# Patient Record
Sex: FEMALE | Race: WHITE | NOT HISPANIC OR LATINO | URBAN - METROPOLITAN AREA
[De-identification: names, ages, dates, MRNs, and addresses within clinical notes are randomized per-mention and may not be internally consistent; named-entity substitution may affect disease eponyms.]

---

## 2024-02-09 ENCOUNTER — OFFICE VISIT (OUTPATIENT)
Dept: WOUND CARE | Facility: HOSPITAL | Age: 84
End: 2024-02-09
Attending: NURSE PRACTITIONER
Payer: MEDICARE

## 2024-02-09 VITALS
DIASTOLIC BLOOD PRESSURE: 63 MMHG | RESPIRATION RATE: 16 BRPM | HEART RATE: 76 BPM | TEMPERATURE: 98 F | SYSTOLIC BLOOD PRESSURE: 115 MMHG

## 2024-02-09 DIAGNOSIS — L89.224 STAGE IV PRESSURE ULCER OF LEFT HIP: Primary | ICD-10-CM

## 2024-02-09 PROCEDURE — 11042 DBRDMT SUBQ TIS 1ST 20SQCM/<: CPT

## 2024-02-09 PROCEDURE — 99203 OFFICE O/P NEW LOW 30 MIN: CPT | Mod: 25

## 2024-02-09 NOTE — PROGRESS NOTES
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   HENT:      Head: Normocephalic.   Pulmonary:      Effort: Pulmonary effort is normal.   Musculoskeletal:      Comments: Bed or wheelchair bound   Skin:     General: Skin is warm and dry.      Comments: Stage 4 pressure ulcer to left hip with muscle exposed.   Neurological:      Mental Status: She is alert. Mental status is at baseline.      Comments: Pt with dementia     Ulcer debrided of slough, will use mesalt qod, calmoseptine to norbert wound. Keep clean, dry and covered, offload pressure and turn q 2 hrs avoiding left hip, culture result noted from nursing home and pt is currently on cipro 500 mg po bid for 10 days. Will recheck in one week, her potential to heal is good.  See wound doc progress notes. Documents will be scanned.        Jeanette Valdez  [facility]

## 2024-02-16 ENCOUNTER — OFFICE VISIT (OUTPATIENT)
Dept: WOUND CARE | Facility: HOSPITAL | Age: 84
End: 2024-02-16
Attending: NURSE PRACTITIONER
Payer: MEDICARE

## 2024-02-16 VITALS
RESPIRATION RATE: 17 BRPM | DIASTOLIC BLOOD PRESSURE: 61 MMHG | SYSTOLIC BLOOD PRESSURE: 113 MMHG | TEMPERATURE: 98 F | HEART RATE: 73 BPM

## 2024-02-16 DIAGNOSIS — L89.224 STAGE IV PRESSURE ULCER OF LEFT HIP: Primary | ICD-10-CM

## 2024-02-16 PROCEDURE — 11042 DBRDMT SUBQ TIS 1ST 20SQCM/<: CPT

## 2024-02-16 NOTE — PROGRESS NOTES
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   HENT:      Head: Normocephalic.   Pulmonary:      Effort: Pulmonary effort is normal.   Musculoskeletal:      Comments: In a wheelchair   Skin:     General: Skin is warm and dry.      Comments: Stage 4 pressure ulcer to left hip with muscle exposed.   Neurological:      Mental Status: She is alert. Mental status is at baseline.     Ulcer debrided, doing better with less slough, still have not received finalized version of culture from nursing home. Pt is on cipro per PCP, will continue to pack with mesalt qod, keep clean and dry and will recheck in 2 weeks. Her potential to heal is fair.  See wound doc progress notes. Documents will be scanned.        Jeanette Valdez  [facility]

## 2024-03-01 ENCOUNTER — OFFICE VISIT (OUTPATIENT)
Dept: WOUND CARE | Facility: HOSPITAL | Age: 84
End: 2024-03-01
Attending: NURSE PRACTITIONER
Payer: MEDICARE

## 2024-03-01 VITALS
RESPIRATION RATE: 17 BRPM | TEMPERATURE: 99 F | DIASTOLIC BLOOD PRESSURE: 65 MMHG | HEART RATE: 75 BPM | SYSTOLIC BLOOD PRESSURE: 110 MMHG

## 2024-03-01 DIAGNOSIS — L89.224 STAGE IV PRESSURE ULCER OF LEFT HIP: Primary | ICD-10-CM

## 2024-03-01 PROCEDURE — 11042 DBRDMT SUBQ TIS 1ST 20SQCM/<: CPT

## 2024-03-01 NOTE — PROGRESS NOTES
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 Documents will be scanned.      Physical Exam  Vitals reviewed.   Pulmonary:      Effort: Pulmonary effort is normal.   Skin:     General: Skin is warm and dry.      Comments: Stage 4 pressure ulcer to left hip with adipose exposed.   Neurological:      Mental Status: She is alert. Mental status is at baseline.     Ulcer debrided, doing well. Will change to silver alginate rope qod, keep clean and dry, avoid laying on left side, will recheck in 2 weeks, her potential to heal is good.    Jeanette Valdez  [facility]

## 2024-03-15 ENCOUNTER — OFFICE VISIT (OUTPATIENT)
Dept: WOUND CARE | Facility: HOSPITAL | Age: 84
End: 2024-03-15
Attending: NURSE PRACTITIONER
Payer: MEDICARE

## 2024-03-15 VITALS
SYSTOLIC BLOOD PRESSURE: 119 MMHG | TEMPERATURE: 98 F | RESPIRATION RATE: 18 BRPM | DIASTOLIC BLOOD PRESSURE: 78 MMHG | HEART RATE: 83 BPM

## 2024-03-15 DIAGNOSIS — L89.224 STAGE IV PRESSURE ULCER OF LEFT HIP: Primary | ICD-10-CM

## 2024-03-15 PROCEDURE — 11042 DBRDMT SUBQ TIS 1ST 20SQCM/<: CPT

## 2024-03-15 NOTE — PROGRESS NOTES
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   Constitutional:       Appearance: Normal appearance.   HENT:      Head: Normocephalic.   Pulmonary:      Effort: Pulmonary effort is normal.   Musculoskeletal:      Comments: In a wheelchair   Skin:     General: Skin is warm and dry.      Comments: Stage 4 pressure ulcer to left hip with adipose exposed   Neurological:      Mental Status: She is alert. Mental status is at baseline.     Ulcer debrided, doing well and smaller in size, will continue silver alginate rope qod, keep clean and dry and offload pressure at all times, will recheck in 2 weeks. Her potential to heal is good.  See wound doc progress notes. Documents will be scanned.        Jeanette Valdez  [facility]

## 2024-03-22 ENCOUNTER — OFFICE VISIT (OUTPATIENT)
Dept: WOUND CARE | Facility: HOSPITAL | Age: 84
End: 2024-03-22
Attending: NURSE PRACTITIONER
Payer: MEDICARE

## 2024-03-22 VITALS
TEMPERATURE: 98 F | HEART RATE: 82 BPM | SYSTOLIC BLOOD PRESSURE: 123 MMHG | RESPIRATION RATE: 18 BRPM | DIASTOLIC BLOOD PRESSURE: 76 MMHG

## 2024-03-22 DIAGNOSIS — L89.224 STAGE IV PRESSURE ULCER OF LEFT HIP: Primary | ICD-10-CM

## 2024-03-22 PROCEDURE — 11042 DBRDMT SUBQ TIS 1ST 20SQCM/<: CPT

## 2024-03-22 NOTE — PROGRESS NOTES
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    Overview     HPI: 84 yr old female that is a resident of a nursing home presenting for wound care for a pressure ulcer to left hip    Location: left hip    Duration: 1-19-24    Context: pressure    Associated Signs & Symptoms: denies pain    Timing: n/a    Severity: n/a    Quality: n/a    Modifying Factors: n/a    2-16-24: here for wound care for pressure ulcer to left hip  3-1-24: here for wound care for pressure ulcer to left hip  3-15-24: here for wound care from the nursing home for a pressure ulcer to left hip  3-22-24: here for wound care for pressure ulcer to left hip          Physical Exam  HENT:      Head: Normocephalic.   Pulmonary:      Effort: Pulmonary effort is normal.   Musculoskeletal:      Comments: Bed or wheelchair bound   Skin:     General: Skin is warm and dry.      Comments: Stage 4 pressure ulcer to left hip with adipose exposed.   Neurological:      Mental Status: She is alert.     Ulcer debrided, doing well, continue silver alginate qod, keep clean,dry and covered. Avoid laying on left hip. Will recheck in 3 weeks. Her potential to heal is good.  See wound doc progress notes. Documents will be scanned.        Jeanette Valdez  [facility]

## 2024-04-12 ENCOUNTER — OFFICE VISIT (OUTPATIENT)
Dept: WOUND CARE | Facility: HOSPITAL | Age: 84
End: 2024-04-12
Attending: NURSE PRACTITIONER
Payer: MEDICARE

## 2024-04-12 VITALS
DIASTOLIC BLOOD PRESSURE: 67 MMHG | SYSTOLIC BLOOD PRESSURE: 119 MMHG | TEMPERATURE: 98 F | RESPIRATION RATE: 18 BRPM | HEART RATE: 79 BPM

## 2024-04-12 DIAGNOSIS — L89.224 STAGE IV PRESSURE ULCER OF LEFT HIP: Primary | ICD-10-CM

## 2024-04-12 PROCEDURE — 11042 DBRDMT SUBQ TIS 1ST 20SQCM/<: CPT

## 2024-04-12 NOTE — PROGRESS NOTES
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    Overview     HPI: 84 yr old female that is a resident of a nursing home presenting for wound care for a pressure ulcer to left hip    Location: left hip    Duration: 1-19-24    Context: pressure    Associated Signs & Symptoms: denies pain    Timing: n/a    Severity: n/a    Quality: n/a    Modifying Factors: n/a    2-16-24: here for wound care for pressure ulcer to left hip  3-1-24: here for wound care for pressure ulcer to left hip  3-15-24: here for wound care from the nursing home for a pressure ulcer to left hip  3-22-24: here for wound care for pressure ulcer to left hip  4-12-24: here for wound care for pressure ulcer to left hip          Physical Exam  Vitals reviewed.   Constitutional:       Appearance: Normal appearance.   HENT:      Head: Normocephalic.   Pulmonary:      Effort: Pulmonary effort is normal.   Musculoskeletal:      Comments: In a wheelchair   Skin:     General: Skin is warm and dry.      Comments: Stage 4 pressure ulcer to left hip with adipose exposed.   Neurological:      General: No focal deficit present.      Mental Status: She is alert.     Ulcer debrided, doing well, will continue to pack with silver alginate qod, keep clean and dry, offload pressure at all times, will recheck in one week, her potential to heal is good.  See wound doc progress notes. Documents will be scanned.        Jeanette Valdez  [facility]

## 2024-04-19 ENCOUNTER — OFFICE VISIT (OUTPATIENT)
Dept: WOUND CARE | Facility: HOSPITAL | Age: 84
End: 2024-04-19
Attending: NURSE PRACTITIONER
Payer: MEDICARE

## 2024-04-19 VITALS
TEMPERATURE: 98 F | HEART RATE: 84 BPM | SYSTOLIC BLOOD PRESSURE: 117 MMHG | RESPIRATION RATE: 18 BRPM | DIASTOLIC BLOOD PRESSURE: 68 MMHG

## 2024-04-19 DIAGNOSIS — L89.224 STAGE IV PRESSURE ULCER OF LEFT HIP: Primary | ICD-10-CM

## 2024-04-19 PROCEDURE — 11042 DBRDMT SUBQ TIS 1ST 20SQCM/<: CPT

## 2024-04-19 NOTE — PROGRESS NOTES
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    Overview     HPI: 84 yr old female that is a resident of a nursing home presenting for wound care for a pressure ulcer to left hip    Location: left hip    Duration: 1-19-24    Context: pressure    Associated Signs & Symptoms: denies pain    Timing: n/a    Severity: n/a    Quality: n/a    Modifying Factors: n/a    2-16-24: here for wound care for pressure ulcer to left hip  3-1-24: here for wound care for pressure ulcer to left hip  3-15-24: here for wound care from the nursing home for a pressure ulcer to left hip  3-22-24: here for wound care for pressure ulcer to left hip  4-12-24: here for wound care for pressure ulcer to left hip  4-19-24: here for wound care for pressure ulcer to left hip        Physical Exam  Constitutional:       Appearance: Normal appearance.   HENT:      Head: Normocephalic.   Pulmonary:      Effort: Pulmonary effort is normal.   Musculoskeletal:      Comments: In a wheelchair   Skin:     General: Skin is warm and dry.      Comments: Stage 4 pressure ulcer to left hip with adipose exposed.    Neurological:      Mental Status: She is alert. Mental status is at baseline.     Ulcer debrided, still with significant undermining from 5-8 o'clock, outer ulcer is trying to close, no packing noted in ulcer upon arrival, pack with silver alginate rope, change qod. Keep clean and dry, avoid positioning on left side, will recheck in 2 weeks, her potential to heal is good.  See wound doc progress notes. Documents will be scanned.        Jeanette Valdez  [facility]

## 2024-05-10 ENCOUNTER — OFFICE VISIT (OUTPATIENT)
Dept: WOUND CARE | Facility: HOSPITAL | Age: 84
End: 2024-05-10
Attending: NURSE PRACTITIONER
Payer: MEDICARE

## 2024-05-10 VITALS
SYSTOLIC BLOOD PRESSURE: 111 MMHG | TEMPERATURE: 98 F | DIASTOLIC BLOOD PRESSURE: 72 MMHG | RESPIRATION RATE: 16 BRPM | HEART RATE: 85 BPM

## 2024-05-10 DIAGNOSIS — L89.224 STAGE IV PRESSURE ULCER OF LEFT HIP: Primary | ICD-10-CM

## 2024-05-10 PROCEDURE — 99214 OFFICE O/P EST MOD 30 MIN: CPT

## 2024-05-10 NOTE — PROGRESS NOTES
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    Overview     HPI: 84 yr old female that is a resident of a nursing home presenting for wound care for a pressure ulcer to left hip    Location: left hip    Duration: 1-19-24    Context: pressure    Associated Signs & Symptoms: denies pain    Timing: n/a    Severity: n/a    Quality: n/a    Modifying Factors: n/a    2-16-24: here for wound care for pressure ulcer to left hip  3-1-24: here for wound care for pressure ulcer to left hip  3-15-24: here for wound care from the nursing home for a pressure ulcer to left hip  3-22-24: here for wound care for pressure ulcer to left hip  4-12-24: here for wound care for pressure ulcer to left hip  4-19-24: here for wound care for pressure ulcer to left hip  5-10-24: here for wound care for pressure ulcer to left hip          Physical Exam  HENT:      Head: Normocephalic.   Pulmonary:      Effort: Pulmonary effort is normal.   Musculoskeletal:      Comments: In a wheelchair   Skin:     General: Skin is warm and dry.      Comments: Stage 4 pressure ulcer to left hip with adipose exposed.   Neurological:      Mental Status: She is alert. Mental status is at baseline.     Ulcer is doing well, still with significant tunnel that is not being thoroughly packed. Continue silver alginate qod, keep clean and dry and offload pressure. Will recheck in 2 weeks.  See wound doc progress notes. Documents will be scanned.        Jeanette Valdez  [facility]

## 2024-05-24 ENCOUNTER — OFFICE VISIT (OUTPATIENT)
Dept: WOUND CARE | Facility: HOSPITAL | Age: 84
End: 2024-05-24
Attending: NURSE PRACTITIONER
Payer: MEDICARE

## 2024-05-24 VITALS — DIASTOLIC BLOOD PRESSURE: 70 MMHG | RESPIRATION RATE: 17 BRPM | SYSTOLIC BLOOD PRESSURE: 118 MMHG | HEART RATE: 71 BPM

## 2024-05-24 DIAGNOSIS — L89.224 STAGE IV PRESSURE ULCER OF LEFT HIP: Primary | ICD-10-CM

## 2024-05-24 PROCEDURE — 11042 DBRDMT SUBQ TIS 1ST 20SQCM/<: CPT

## 2024-05-24 NOTE — PROGRESS NOTES
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    Overview     HPI: 84 yr old female that is a resident of a nursing home presenting for wound care for a pressure ulcer to left hip    Location: left hip    Duration: 1-19-24    Context: pressure    Associated Signs & Symptoms: denies pain    Timing: n/a    Severity: n/a    Quality: n/a    Modifying Factors: n/a    2-16-24: here for wound care for pressure ulcer to left hip  3-1-24: here for wound care for pressure ulcer to left hip  3-15-24: here for wound care from the nursing home for a pressure ulcer to left hip  3-22-24: here for wound care for pressure ulcer to left hip  4-12-24: here for wound care for pressure ulcer to left hip  4-19-24: here for wound care for pressure ulcer to left hip  5-10-24: here for wound care for pressure ulcer to left hip  5-24-24: here for wound care for pressure ulcer to left hip            See wound doc progress notes. Documents will be scanned.        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    Overview     HPI: 84 yr old female that is a resident of a nursing home presenting for wound care for a pressure ulcer to left hip    Location: left hip    Duration: 1-19-24    Context: pressure    Associated Signs & Symptoms: denies pain    Timing: n/a    Severity: n/a    Quality: n/a    Modifying Factors: n/a    2-16-24: here for wound care for pressure ulcer to left hip  3-1-24: here for wound care for pressure ulcer to left hip  3-15-24: here for wound care from the nursing home for a pressure ulcer to left hip  3-22-24: here for wound care for pressure ulcer to left hip  4-12-24: here for wound care for pressure ulcer to left hip  4-19-24: here  for wound care for pressure ulcer to left hip  5-10-24: here for wound care for pressure ulcer to left hip  5-24-24: here for wound care for pressure ulcer to left hip          Physical Exam  Pulmonary:      Effort: Pulmonary effort is normal.   Musculoskeletal:      Comments: Pt is in a bed or wheelchair.   Skin:     General: Skin is warm and dry.      Comments: Stage 4 pressure to left hip with adipose exposed.   Neurological:      Mental Status: She is alert. Mental status is at baseline.   Ulcer is doing well, still with significant tunnel that is not being thoroughly packed. Continue silver alginate qod, keep clean and dry and offload pressure. Will recheck in 2 weeks.     See wound doc progress notes. Documents will be scanned.        Jeanette LeBoeuf [facility]

## 2024-06-07 ENCOUNTER — OFFICE VISIT (OUTPATIENT)
Dept: WOUND CARE | Facility: HOSPITAL | Age: 84
End: 2024-06-07
Attending: NURSE PRACTITIONER
Payer: MEDICARE

## 2024-06-07 VITALS
HEART RATE: 70 BPM | DIASTOLIC BLOOD PRESSURE: 67 MMHG | TEMPERATURE: 99 F | SYSTOLIC BLOOD PRESSURE: 116 MMHG | RESPIRATION RATE: 17 BRPM

## 2024-06-07 DIAGNOSIS — L89.224 STAGE IV PRESSURE ULCER OF LEFT HIP: Primary | ICD-10-CM

## 2024-06-07 PROCEDURE — 11042 DBRDMT SUBQ TIS 1ST 20SQCM/<: CPT

## 2024-06-07 NOTE — PROGRESS NOTES
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    Overview     HPI: 84 yr old female that is a resident of a nursing home presenting for wound care for a pressure ulcer to left hip    Location: left hip    Duration: 1-19-24    Context: pressure    Associated Signs & Symptoms: denies pain    Timing: n/a    Severity: n/a    Quality: n/a    Modifying Factors: n/a    2-16-24: here for wound care for pressure ulcer to left hip  3-1-24: here for wound care for pressure ulcer to left hip  3-15-24: here for wound care from the nursing home for a pressure ulcer to left hip  3-22-24: here for wound care for pressure ulcer to left hip  4-12-24: here for wound care for pressure ulcer to left hip  4-19-24: here for wound care for pressure ulcer to left hip  5-10-24: here for wound care for pressure ulcer to left hip  5-24-24: here for wound care for pressure ulcer to left hip  6-7-24: here for wound care for pressure ulcer to left hip          Physical Exam  HENT:      Head: Normocephalic.   Pulmonary:      Effort: Pulmonary effort is normal.   Musculoskeletal:         General: Normal range of motion.      Comments: Bed or wheelchair bound   Skin:     General: Skin is warm and dry.      Comments: Stage 4 pressure ulcer to left hip with adipose exposed.   Neurological:      Mental Status: She is alert. Mental status is at baseline.     Ulcer debrided, smaller in size, will change to nick qod, keep clean and dry and offload pressure. Will recheck in 2 weeks   See wound doc progress notes. Documents will be scanned.        Jeanette Valdez  [facility]

## 2024-06-21 ENCOUNTER — OFFICE VISIT (OUTPATIENT)
Dept: WOUND CARE | Facility: HOSPITAL | Age: 84
End: 2024-06-21
Attending: NURSE PRACTITIONER
Payer: MEDICARE

## 2024-06-21 VITALS
TEMPERATURE: 99 F | HEART RATE: 75 BPM | SYSTOLIC BLOOD PRESSURE: 114 MMHG | DIASTOLIC BLOOD PRESSURE: 68 MMHG | RESPIRATION RATE: 17 BRPM

## 2024-06-21 DIAGNOSIS — L89.224 STAGE IV PRESSURE ULCER OF LEFT HIP: Primary | ICD-10-CM

## 2024-06-21 PROCEDURE — 97597 DBRDMT OPN WND 1ST 20 CM/<: CPT

## 2024-06-21 NOTE — PROGRESS NOTES
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    Overview     HPI: 84 yr old female that is a resident of a nursing home presenting for wound care for a pressure ulcer to left hip    Location: left hip    Duration: 1-19-24    Context: pressure    Associated Signs & Symptoms: denies pain    Timing: n/a    Severity: n/a    Quality: n/a    Modifying Factors: n/a    2-16-24: here for wound care for pressure ulcer to left hip  3-1-24: here for wound care for pressure ulcer to left hip  3-15-24: here for wound care from the nursing home for a pressure ulcer to left hip  3-22-24: here for wound care for pressure ulcer to left hip  4-12-24: here for wound care for pressure ulcer to left hip  4-19-24: here for wound care for pressure ulcer to left hip  5-10-24: here for wound care for pressure ulcer to left hip  5-24-24: here for wound care for pressure ulcer to left hip  6-7-24: here for wound care for pressure ulcer to left hip  6-21-24: here for wound care for pressure ulcer to left hip.          Physical Exam  HENT:      Head: Normocephalic.   Pulmonary:      Effort: Pulmonary effort is normal.   Skin:     General: Skin is warm and dry.      Comments: Stage 4 pressure ulcer to left hip with adipose exposed.    Neurological:      Mental Status: She is alert. Mental status is at baseline.     Selective debridement done, doing well and getting smaller. Will continue nick qod, keep clean and dry and will recheck in 3 weeks.  See wound doc progress notes. Documents will be scanned.        Jeanette Valdez  [facility]

## 2024-07-12 ENCOUNTER — OFFICE VISIT (OUTPATIENT)
Dept: WOUND CARE | Facility: HOSPITAL | Age: 84
End: 2024-07-12
Attending: NURSE PRACTITIONER
Payer: MEDICARE

## 2024-07-12 VITALS
TEMPERATURE: 98 F | SYSTOLIC BLOOD PRESSURE: 121 MMHG | DIASTOLIC BLOOD PRESSURE: 70 MMHG | HEART RATE: 73 BPM | RESPIRATION RATE: 18 BRPM

## 2024-07-12 DIAGNOSIS — L89.223 PRESSURE ULCER OF LEFT HIP, STAGE 3: ICD-10-CM

## 2024-07-12 DIAGNOSIS — L89.224 STAGE IV PRESSURE ULCER OF LEFT HIP: Primary | ICD-10-CM

## 2024-07-12 PROCEDURE — 87070 CULTURE OTHR SPECIMN AEROBIC: CPT | Performed by: NURSE PRACTITIONER

## 2024-07-12 PROCEDURE — 87075 CULTR BACTERIA EXCEPT BLOOD: CPT | Performed by: NURSE PRACTITIONER

## 2024-07-12 PROCEDURE — 11042 DBRDMT SUBQ TIS 1ST 20SQCM/<: CPT

## 2024-07-12 NOTE — PROGRESS NOTES
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    Overview     HPI: 84 yr old female that is a resident of a nursing home presenting for wound care for a pressure ulcer to left hip    Location: left hip, left distal hip    Duration: left: 1-19-24, left distal hip    Context: pressure    Associated Signs & Symptoms: denies pain    Timing: n/a    Severity: n/a    Quality: n/a    Modifying Factors: n/a    2-16-24: here for wound care for pressure ulcer to left hip  3-1-24: here for wound care for pressure ulcer to left hip  3-15-24: here for wound care from the nursing home for a pressure ulcer to left hip  3-22-24: here for wound care for pressure ulcer to left hip  4-12-24: here for wound care for pressure ulcer to left hip  4-19-24: here for wound care for pressure ulcer to left hip  5-10-24: here for wound care for pressure ulcer to left hip  5-24-24: here for wound care for pressure ulcer to left hip  6-7-24: here for wound care for pressure ulcer to left hip  6-21-24: here for wound care for pressure ulcer to left hip.  7-12-24: here for wound care for pressure ulcer to left hip, she has another one on her left hip as well.         Pressure ulcer of left hip, stage 3    Relevant Orders    CULTURE, AEROBIC  (SPECIFY SOURCE)    CULTURE, ANAEROBE     Physical Exam  Constitutional:       Appearance: Normal appearance.   Pulmonary:      Effort: Pulmonary effort is normal.   Musculoskeletal:         General: Normal range of motion.   Skin:     General: Skin is warm and dry.      Comments: Stage 4 pressure ulcer to left hip with adipose exposed, new stage 3 pressure ulcer to left distal hip with adipose exposed.   Neurological:      Mental Status: She is alert and oriented to person, place, and time.   Psychiatric:         Mood and Affect: Mood normal.         Behavior: Behavior normal.          Thought Content: Thought content normal.         Judgment: Judgment normal.     Ulcers debrided, culture obtained from new distal hip, will pack with silver rope qod, keep clean and dry, offload pressure ulcer at all times and will recheck in one week, her potential to heal is good.  See wound doc progress notes. Documents will be scanned.        Jeanette Valdez  [facility]

## 2024-07-13 LAB
BACTERIA SPEC AEROBE CULT: NORMAL
BACTERIA SPEC ANAEROBE CULT: NORMAL

## 2024-07-26 ENCOUNTER — OFFICE VISIT (OUTPATIENT)
Dept: WOUND CARE | Facility: HOSPITAL | Age: 84
End: 2024-07-26
Attending: NURSE PRACTITIONER
Payer: MEDICARE

## 2024-07-26 VITALS — SYSTOLIC BLOOD PRESSURE: 120 MMHG | DIASTOLIC BLOOD PRESSURE: 70 MMHG | HEART RATE: 80 BPM

## 2024-07-26 DIAGNOSIS — L89.223 PRESSURE ULCER OF LEFT HIP, STAGE 3: Primary | ICD-10-CM

## 2024-07-26 DIAGNOSIS — L89.224 STAGE IV PRESSURE ULCER OF LEFT HIP: ICD-10-CM

## 2024-07-26 PROCEDURE — 17250 CHEM CAUT OF GRANLTJ TISSUE: CPT

## 2024-07-26 NOTE — PROGRESS NOTES
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    Overview     HPI: 84 yr old female that is a resident of a nursing home presenting for wound care for a pressure ulcer to left hip    Location: left hip, left distal hip    Duration: left: 1-19-24, left distal hip    Context: pressure    Associated Signs & Symptoms: denies pain    Timing: n/a    Severity: n/a    Quality: n/a    Modifying Factors: n/a    2-16-24: here for wound care for pressure ulcer to left hip  3-1-24: here for wound care for pressure ulcer to left hip  3-15-24: here for wound care from the nursing home for a pressure ulcer to left hip  3-22-24: here for wound care for pressure ulcer to left hip  4-12-24: here for wound care for pressure ulcer to left hip  4-19-24: here for wound care for pressure ulcer to left hip  5-10-24: here for wound care for pressure ulcer to left hip  5-24-24: here for wound care for pressure ulcer to left hip  6-7-24: here for wound care for pressure ulcer to left hip  6-21-24: here for wound care for pressure ulcer to left hip.  7-12-24: here for wound care for pressure ulcer to left hip, she has another one on her left hip as well.  7-26-24: here for wound care for pressure ulcers to left hip         Pressure ulcer of left hip, stage 3 - Primary     Physical Exam  HENT:      Head: Normocephalic.   Pulmonary:      Effort: Pulmonary effort is normal.   Musculoskeletal:      Comments: In a wheelchair   Skin:     General: Skin is warm and dry.      Comments: Stage 4 pressure ulcer to left hip with adipose exposed, stage 3 pressure ulcer to left distal hip with adipose exposed and hypergranulation tissue   Neurological:      Mental Status: She is alert. Mental status is at baseline.     Ulcers debrided, culture obtained from new distal hip noted and is on Bactrim DS po bid, will get labs checked per NH, will pack  with silver rope qod, keep clean and dry, offload pressure ulcer at all times and will recheck in one week, her potential to heal is good.   See wound doc progress notes. Documents will be scanned.        Jeanette Valdez  [facility]

## 2024-08-02 ENCOUNTER — OFFICE VISIT (OUTPATIENT)
Dept: WOUND CARE | Facility: HOSPITAL | Age: 84
End: 2024-08-02
Attending: NURSE PRACTITIONER
Payer: MEDICARE

## 2024-08-02 VITALS
HEART RATE: 77 BPM | TEMPERATURE: 97 F | DIASTOLIC BLOOD PRESSURE: 77 MMHG | SYSTOLIC BLOOD PRESSURE: 125 MMHG | RESPIRATION RATE: 18 BRPM

## 2024-08-02 DIAGNOSIS — L89.224 STAGE IV PRESSURE ULCER OF LEFT HIP: ICD-10-CM

## 2024-08-02 DIAGNOSIS — L89.223 PRESSURE ULCER OF LEFT HIP, STAGE 3: Primary | ICD-10-CM

## 2024-08-02 PROCEDURE — 11042 DBRDMT SUBQ TIS 1ST 20SQCM/<: CPT

## 2024-08-02 NOTE — PROGRESS NOTES
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    Overview     HPI: 84 yr old female that is a resident of a nursing home presenting for wound care for a pressure ulcer to left hip    Location: left hip, left distal hip    Duration: left: 1-19-24, left distal hip    Context: pressure    Associated Signs & Symptoms: denies pain    Timing: n/a    Severity: n/a    Quality: n/a    Modifying Factors: n/a    2-16-24: here for wound care for pressure ulcer to left hip  3-1-24: here for wound care for pressure ulcer to left hip  3-15-24: here for wound care from the nursing home for a pressure ulcer to left hip  3-22-24: here for wound care for pressure ulcer to left hip  4-12-24: here for wound care for pressure ulcer to left hip  4-19-24: here for wound care for pressure ulcer to left hip  5-10-24: here for wound care for pressure ulcer to left hip  5-24-24: here for wound care for pressure ulcer to left hip  6-7-24: here for wound care for pressure ulcer to left hip  6-21-24: here for wound care for pressure ulcer to left hip.  7-12-24: here for wound care for pressure ulcer to left hip, she has another one on her left hip as well.  7-26-24: here for wound care for pressure ulcers to left hip  8-2-24: here for wound care for pressure ulcers to left hip.         Pressure ulcer of left hip, stage 3 - Primary   Physical Exam  Vitals reviewed.   HENT:      Head: Normocephalic.   Pulmonary:      Effort: Pulmonary effort is normal.   Skin:     General: Skin is warm and dry.      Comments: Stage 4 pressure ulcer to left hip with adipose exposed, stage 3 pressure ulcer to left distal hip with adipose exposed and hypergranulation tissue    Neurological:      Mental Status: She is alert. Mental status is at baseline.     Ulcers debrided, ordered labs checked per NH but not done, they stated they will have them done ,  will pack with silver rope qod, keep clean and dry, offload pressure ulcer at all times and will recheck in 2 weeks, her potential to heal is good.     See wound doc progress notes. Documents will be scanned.        Jeanette EisenbergBoeuf  [facility]

## 2024-08-16 ENCOUNTER — OFFICE VISIT (OUTPATIENT)
Dept: WOUND CARE | Facility: HOSPITAL | Age: 84
End: 2024-08-16
Attending: NURSE PRACTITIONER
Payer: MEDICARE

## 2024-08-16 VITALS
SYSTOLIC BLOOD PRESSURE: 123 MMHG | DIASTOLIC BLOOD PRESSURE: 75 MMHG | RESPIRATION RATE: 16 BRPM | HEART RATE: 78 BPM | TEMPERATURE: 99 F

## 2024-08-16 DIAGNOSIS — L89.224 STAGE IV PRESSURE ULCER OF LEFT HIP: ICD-10-CM

## 2024-08-16 DIAGNOSIS — L89.223 PRESSURE ULCER OF LEFT HIP, STAGE 3: Primary | ICD-10-CM

## 2024-08-16 PROCEDURE — 17250 CHEM CAUT OF GRANLTJ TISSUE: CPT

## 2024-08-16 NOTE — PROGRESS NOTES
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    Overview     HPI: 84 yr old female that is a resident of a nursing home presenting for wound care for a pressure ulcer to left hip    Location: left hip, left distal hip    Duration: left: 1-19-24, left distal hip    Context: pressure    Associated Signs & Symptoms: denies pain    Timing: n/a    Severity: n/a    Quality: n/a    Modifying Factors: n/a    2-16-24: here for wound care for pressure ulcer to left hip  3-1-24: here for wound care for pressure ulcer to left hip  3-15-24: here for wound care from the nursing home for a pressure ulcer to left hip  3-22-24: here for wound care for pressure ulcer to left hip  4-12-24: here for wound care for pressure ulcer to left hip  4-19-24: here for wound care for pressure ulcer to left hip  5-10-24: here for wound care for pressure ulcer to left hip  5-24-24: here for wound care for pressure ulcer to left hip  6-7-24: here for wound care for pressure ulcer to left hip  6-21-24: here for wound care for pressure ulcer to left hip.  7-12-24: here for wound care for pressure ulcer to left hip, she has another one on her left hip as well.  7-26-24: here for wound care for pressure ulcers to left hip  8-2-24: here for wound care for pressure ulcers to left hip.  8-16-24: here for wound care for pressure ulcers to left hip.         Pressure ulcer of left hip, stage 3 - Primary   Physical Exam  HENT:      Head: Normocephalic.   Pulmonary:      Effort: Pulmonary effort is normal.   Musculoskeletal:      Comments: Pt in a wheelchair   Skin:     General: Skin is warm and dry.      Comments: Stage 4 pressure ulcer to left hip is healed.  stage 3 pressure ulcer to left distal hip with adipose exposed and hypergranulation tissue     Neurological:      Mental Status: She is alert. Mental status is at baseline.     Ulcer  debrided, will pack with silver rope qod, keep clean and dry, offload pressure ulcer at all times and will recheck in 2 weeks, her potential to heal is good. Silver nitrate applied to hypergranulation tissue.    See wound doc progress notes. Documents will be scanned.        Jeanette EisenbergBoeuf  [facility]

## 2024-08-30 ENCOUNTER — OFFICE VISIT (OUTPATIENT)
Dept: WOUND CARE | Facility: HOSPITAL | Age: 84
End: 2024-08-30
Attending: NURSE PRACTITIONER
Payer: MEDICARE

## 2024-08-30 VITALS
HEART RATE: 78 BPM | TEMPERATURE: 98 F | RESPIRATION RATE: 18 BRPM | SYSTOLIC BLOOD PRESSURE: 109 MMHG | DIASTOLIC BLOOD PRESSURE: 63 MMHG

## 2024-08-30 DIAGNOSIS — L89.223 PRESSURE ULCER OF LEFT HIP, STAGE 3: Primary | ICD-10-CM

## 2024-08-30 PROCEDURE — 17250 CHEM CAUT OF GRANLTJ TISSUE: CPT

## 2024-08-30 NOTE — PROGRESS NOTES
[facility]  Wound Care  Progress Note    Problem List Items Addressed This Visit          Orthopedic    Pressure ulcer of left hip, stage 3 - Primary    Overview     HPI: 84 yr old female that is a resident of a nursing home presenting for wound care for a pressure ulcer to left hip    Location: left hip, left distal hip    Duration: left: 1-19-24, left distal hip    Context: pressure    Associated Signs & Symptoms: denies pain    Timing: n/a    Severity: n/a    Quality: n/a    Modifying Factors: n/a    2-16-24: here for wound care for pressure ulcer to left hip  3-1-24: here for wound care for pressure ulcer to left hip  3-15-24: here for wound care from the nursing home for a pressure ulcer to left hip  3-22-24: here for wound care for pressure ulcer to left hip  4-12-24: here for wound care for pressure ulcer to left hip  4-19-24: here for wound care for pressure ulcer to left hip  5-10-24: here for wound care for pressure ulcer to left hip  5-24-24: here for wound care for pressure ulcer to left hip  6-7-24: here for wound care for pressure ulcer to left hip  6-21-24: here for wound care for pressure ulcer to left hip.  7-12-24: here for wound care for pressure ulcer to left hip, she has another one on her left hip as well.  7-26-24: here for wound care for pressure ulcers to left hip  8-2-24: here for wound care for pressure ulcers to left hip.  8-16-24: here for wound care for pressure ulcers to left hip.  8-30-24: here for wound care for pressure ulcer to left hip          Physical Exam  Constitutional:       Appearance: Normal appearance.   HENT:      Head: Normocephalic.   Pulmonary:      Effort: Pulmonary effort is normal.   Musculoskeletal:      Comments: In a wheelchair   Skin:     General: Skin is warm and dry.      Comments: Stage 3 pressure ulcer to left hip with adipose exposed.   Neurological:      Mental Status: She is alert and oriented to person, place, and time.    Psychiatric:         Mood and Affect: Mood normal.         Behavior: Behavior normal.         Thought Content: Thought content normal.         Judgment: Judgment normal.     will continue to pack with silver rope qod, keep clean and dry, offload pressure ulcer at all times and will recheck in 2 weeks, her potential to heal is good. Silver nitrate applied to hypergranulation tissue.     See wound doc progress notes. Documents will be scanned.        Jeanette LeBoeuf [facility]

## 2024-09-13 ENCOUNTER — OFFICE VISIT (OUTPATIENT)
Dept: WOUND CARE | Facility: HOSPITAL | Age: 84
End: 2024-09-13
Attending: NURSE PRACTITIONER
Payer: MEDICARE

## 2024-09-13 VITALS
SYSTOLIC BLOOD PRESSURE: 112 MMHG | RESPIRATION RATE: 17 BRPM | HEART RATE: 74 BPM | TEMPERATURE: 98 F | DIASTOLIC BLOOD PRESSURE: 67 MMHG

## 2024-09-13 DIAGNOSIS — L89.223 PRESSURE ULCER OF LEFT HIP, STAGE 3: Primary | ICD-10-CM

## 2024-09-13 PROCEDURE — 11042 DBRDMT SUBQ TIS 1ST 20SQCM/<: CPT

## 2024-09-13 NOTE — PROGRESS NOTES
[facility]  Wound Care  Progress Note    Problem List Items Addressed This Visit          Orthopedic    Pressure ulcer of left hip, stage 3 - Primary    Overview     HPI: 84 yr old female that is a resident of a nursing home presenting for wound care for a pressure ulcer to left hip    Location: left hip, left distal hip    Duration: left: 1-19-24, left distal hip    Context: pressure    Associated Signs & Symptoms: denies pain    Timing: n/a    Severity: n/a    Quality: n/a    Modifying Factors: n/a    2-16-24: here for wound care for pressure ulcer to left hip  3-1-24: here for wound care for pressure ulcer to left hip  3-15-24: here for wound care from the nursing home for a pressure ulcer to left hip  3-22-24: here for wound care for pressure ulcer to left hip  4-12-24: here for wound care for pressure ulcer to left hip  4-19-24: here for wound care for pressure ulcer to left hip  5-10-24: here for wound care for pressure ulcer to left hip  5-24-24: here for wound care for pressure ulcer to left hip  6-7-24: here for wound care for pressure ulcer to left hip  6-21-24: here for wound care for pressure ulcer to left hip.  7-12-24: here for wound care for pressure ulcer to left hip, she has another one on her left hip as well.  7-26-24: here for wound care for pressure ulcers to left hip  8-2-24: here for wound care for pressure ulcers to left hip.  8-16-24: here for wound care for pressure ulcers to left hip.  8-30-24: here for wound care for pressure ulcer to left hip  9-13-24: here for wound care for pressure ulcer to left hip        Physical Exam  Vitals reviewed.   Constitutional:       Appearance: Normal appearance.   HENT:      Head: Normocephalic.   Pulmonary:      Effort: Pulmonary effort is normal.   Musculoskeletal:         General: Normal range of motion.   Skin:     General: Skin is warm and dry.      Comments:  Stage 3 pressure ulcer to left hip with adipose exposed.     Neurological:      Mental Status: She is alert. Mental status is at baseline.   Psychiatric:         Mood and Affect: Mood normal.         Behavior: Behavior normal.         Thought Content: Thought content normal.         Judgment: Judgment normal.     Will continue to pack with silver rope qod, keep clean and dry, offload pressure ulcer at all times and will recheck in 2 weeks, her potential to heal is good. Silver nitrate applied to hypergranulation tissue.       See wound doc progress notes. Documents will be scanned.        Jeanette José Miguel  [facility]

## 2024-09-27 ENCOUNTER — OFFICE VISIT (OUTPATIENT)
Dept: WOUND CARE | Facility: HOSPITAL | Age: 84
End: 2024-09-27
Attending: NURSE PRACTITIONER
Payer: MEDICARE

## 2024-09-27 VITALS
RESPIRATION RATE: 17 BRPM | SYSTOLIC BLOOD PRESSURE: 111 MMHG | TEMPERATURE: 99 F | DIASTOLIC BLOOD PRESSURE: 71 MMHG | HEART RATE: 76 BPM

## 2024-09-27 DIAGNOSIS — L89.223 PRESSURE ULCER OF LEFT HIP, STAGE 3: Primary | ICD-10-CM

## 2024-09-27 PROCEDURE — 11042 DBRDMT SUBQ TIS 1ST 20SQCM/<: CPT

## 2024-09-27 NOTE — PROGRESS NOTES
[facility]  Wound Care  Progress Note    Problem List Items Addressed This Visit          Orthopedic    Pressure ulcer of left hip, stage 3 - Primary    Overview     HPI: 84 yr old female that is a resident of a nursing home presenting for wound care for a pressure ulcer to left hip    Location: left hip, left distal hip    Duration: left: 1-19-24, left distal hip    Context: pressure    Associated Signs & Symptoms: denies pain    Timing: n/a    Severity: n/a    Quality: n/a    Modifying Factors: n/a    2-16-24: here for wound care for pressure ulcer to left hip  3-1-24: here for wound care for pressure ulcer to left hip  3-15-24: here for wound care from the nursing home for a pressure ulcer to left hip  3-22-24: here for wound care for pressure ulcer to left hip  4-12-24: here for wound care for pressure ulcer to left hip  4-19-24: here for wound care for pressure ulcer to left hip  5-10-24: here for wound care for pressure ulcer to left hip  5-24-24: here for wound care for pressure ulcer to left hip  6-7-24: here for wound care for pressure ulcer to left hip  6-21-24: here for wound care for pressure ulcer to left hip.  7-12-24: here for wound care for pressure ulcer to left hip, she has another one on her left hip as well.  7-26-24: here for wound care for pressure ulcers to left hip  8-2-24: here for wound care for pressure ulcers to left hip.  8-16-24: here for wound care for pressure ulcers to left hip.  8-30-24: here for wound care for pressure ulcer to left hip  9-13-24: here for wound care for pressure ulcer to left hip  9-27-24: here for wound care for pressure ulcer to left hip          Physical Exam  Constitutional:       Appearance: Normal appearance.   HENT:      Head: Normocephalic.   Pulmonary:      Effort: Pulmonary effort is normal.   Skin:     General: Skin is warm and dry.      Comments: Stage 3 pressure ulcer to left hip with adipose exposed.   Neurological:      Mental  Status: She is alert.     Will continue to pack with silver rope qod, keep clean and dry, offload pressure ulcer at all times and will recheck in 2 weeks, her potential to heal is good. Silver nitrate applied to hypergranulation tissue.        See wound doc progress notes. Documents will be scanned.        Jeanette EisenbergBoeuf  [facility]